# Patient Record
Sex: MALE | Race: WHITE | NOT HISPANIC OR LATINO | Employment: FULL TIME | URBAN - METROPOLITAN AREA
[De-identification: names, ages, dates, MRNs, and addresses within clinical notes are randomized per-mention and may not be internally consistent; named-entity substitution may affect disease eponyms.]

---

## 2023-05-19 ENCOUNTER — HOSPITAL ENCOUNTER (EMERGENCY)
Facility: HOSPITAL | Age: 28
Discharge: HOME | End: 2023-05-19
Attending: EMERGENCY MEDICINE

## 2023-05-19 ENCOUNTER — APPOINTMENT (EMERGENCY)
Dept: RADIOLOGY | Facility: HOSPITAL | Age: 28
End: 2023-05-19

## 2023-05-19 VITALS
BODY MASS INDEX: 25.18 KG/M2 | TEMPERATURE: 98.3 F | OXYGEN SATURATION: 97 % | DIASTOLIC BLOOD PRESSURE: 75 MMHG | RESPIRATION RATE: 18 BRPM | HEART RATE: 84 BPM | HEIGHT: 69 IN | WEIGHT: 170 LBS | SYSTOLIC BLOOD PRESSURE: 119 MMHG

## 2023-05-19 DIAGNOSIS — K59.00 CONSTIPATION: Primary | ICD-10-CM

## 2023-05-19 RX ORDER — LACTULOSE 20 G/30ML
20 SOLUTION ORAL DAILY
Qty: 210 ML | Refills: 0 | Status: SHIPPED | OUTPATIENT
Start: 2023-05-19 | End: 2023-05-26

## 2023-05-19 NOTE — ED PROVIDER NOTES
History  Chief Complaint   Patient presents with   • Constipation     Pt presents today for constipation  Last BM was this morning, however he has been using milk of magnesia and enemas  States hes gone about 4-5 times over the last 2 weeks  68-year-old male patient presenting here today with a chief complaint of not moving his bowels normally  Symptoms started about 3 weeks ago  He reports that prior to that he would go every 2 to 3 days  Last bowel movement was probably 2 days ago for him  No focal abdominal tenderness on physical examination he has been using milk of magnesia over-the-counter with minimal relief          None       Past Medical History:   Diagnosis Date   • Heroin use     hasnt used in 6-7 years   • Stroke [facility])        Past Surgical History:   Procedure Laterality Date   • AORTIC VALVE REPAIR     • BRAIN SURGERY         History reviewed  No pertinent family history  I have reviewed and agree with the history as documented  E-Cigarette/Vaping     E-Cigarette/Vaping Substances   • Nicotine Yes      Social History     Tobacco Use   • Smoking status: Former     Types: Cigarettes   • Smokeless tobacco: Never   Substance Use Topics   • Drug use: Not Currently     Types: Heroin     Comment: 6-7 years sober       Review of Systems   Constitutional: Negative for chills and fever  HENT: Negative for ear pain and sore throat  Eyes: Negative for pain and visual disturbance  Respiratory: Negative for cough and shortness of breath  Cardiovascular: Negative for chest pain and palpitations  Gastrointestinal: Negative for abdominal pain and vomiting  Genitourinary: Negative for dysuria and hematuria  Musculoskeletal: Negative for arthralgias and back pain  Skin: Negative for color change and rash  Neurological: Negative for seizures and syncope  All other systems reviewed and are negative  Physical Exam  Physical Exam  Vitals and nursing note reviewed     Constitutional: General: He is not in acute distress  Appearance: He is well-developed  HENT:      Head: Normocephalic and atraumatic  Eyes:      General:         Right eye: No discharge  Left eye: No discharge  Conjunctiva/sclera: Conjunctivae normal    Cardiovascular:      Rate and Rhythm: Normal rate  Pulmonary:      Effort: Pulmonary effort is normal  No respiratory distress  Abdominal:      General: There is no distension  Tenderness: There is no guarding  Musculoskeletal:         General: No deformity  Cervical back: Normal range of motion and neck supple  Skin:     General: Skin is warm and dry  Neurological:      Mental Status: He is alert and oriented to person, place, and time  Coordination: Coordination normal          Vital Signs  ED Triage Vitals [05/19/23 1312]   Temperature Pulse Respirations Blood Pressure SpO2   98 3 °F (36 8 °C) 84 18 119/75 97 %      Temp Source Heart Rate Source Patient Position - Orthostatic VS BP Location FiO2 (%)   Tympanic Monitor Sitting Left arm --      Pain Score       --           Vitals:    05/19/23 1312   BP: 119/75   Pulse: 84   Patient Position - Orthostatic VS: Sitting         Visual Acuity      ED Medications  Medications - No data to display    Diagnostic Studies  Results Reviewed     None                 XR abdomen obstruction series    (Results Pending)              Procedures  Procedures         ED Course                                             Medical Decision Making  Some stool noted in the colon on obstruction series  Nonobstructive bowel gas pattern  Patient instructed to perform an enema at home    Constipation: acute illness or injury  Amount and/or Complexity of Data Reviewed  Radiology: ordered  Risk  Prescription drug management            Disposition  Final diagnoses:   Constipation     Time reflects when diagnosis was documented in both MDM as applicable and the Disposition within this note     Time User Action Codes Description Comment    5/19/2023  2:55 PM Sapna Ortiz Add [K59 00] Constipation       ED Disposition     ED Disposition   Discharge    Condition   Stable    Date/Time   Fri May 19, 2023  2:55 PM    Comment   Nimisha Duval discharge to home/self care  Follow-up Information     Follow up With Specialties Details Why Contact Info Additional Dmitriy [facility] Schedule an appointment as soon as possible for a visit  For Continued Evaluation [address] [address]  Joyce Andrews [address] [facility], [address] [place] [address], [facility] [place], [place], [address] [facility]           Discharge Medication List as of 5/19/2023  2:56 PM      START taking these medications    Details   lactulose 20 g/30 mL Take 30 mL (20 g total) by mouth daily for 7 days, Starting Fri 5/19/2023, Until Fri 5/26/2023, Normal             No discharge procedures on file      PDMP Review     None          ED Provider  Electronically Signed by           NATIVIDAD Delgado  05/19/23 3828

## 2023-10-24 ENCOUNTER — OFFICE VISIT (OUTPATIENT)
Age: 28
End: 2023-10-24
Payer: COMMERCIAL

## 2023-10-24 VITALS
SYSTOLIC BLOOD PRESSURE: 119 MMHG | BODY MASS INDEX: 25.37 KG/M2 | OXYGEN SATURATION: 98 % | HEART RATE: 76 BPM | TEMPERATURE: 98.5 F | WEIGHT: 171.8 LBS | DIASTOLIC BLOOD PRESSURE: 58 MMHG

## 2023-10-24 DIAGNOSIS — H00.011 HORDEOLUM OF RIGHT UPPER EYELID, UNSPECIFIED HORDEOLUM TYPE: Primary | ICD-10-CM

## 2023-10-24 DIAGNOSIS — H10.9 CONJUNCTIVITIS OF RIGHT EYE, UNSPECIFIED CONJUNCTIVITIS TYPE: ICD-10-CM

## 2023-10-24 PROCEDURE — 99213 OFFICE O/P EST LOW 20 MIN: CPT | Performed by: EMERGENCY MEDICINE

## 2023-10-24 RX ORDER — OFLOXACIN 3 MG/ML
1 SOLUTION/ DROPS OPHTHALMIC 4 TIMES DAILY
Qty: 5 ML | Refills: 0 | Status: SHIPPED | OUTPATIENT
Start: 2023-10-24

## 2023-10-24 NOTE — LETTER
October 24, 2023     Patient: Pamela Millard   YOB: 1995   Date of Visit: 10/24/2023       To Whom It May Concern: It is my medical opinion that Pamela Millard may return to work on 10/26/2023 . If you have any questions or concerns, please don't hesitate to call.          Sincerely,        Bulmaro Santos DO    CC: No Recipients

## 2023-10-24 NOTE — PATIENT INSTRUCTIONS
Meds as instructed  Use your own hand towel  Wipe discharge with a warm washcloth from the inside out  F/u with PCP in 2-3 days

## 2023-10-24 NOTE — PROGRESS NOTES
[facility]        NAME: Lizabeth Lawton is a 32 y.o. male  : 1995    MRN: [id number]  DATE: 2023  TIME: 11:23 AM    Assessment and Plan   Hordeolum of right upper eyelid, unspecified hordeolum type [H00.011]  1. Hordeolum of right upper eyelid, unspecified hordeolum type        2. Conjunctivitis of right eye, unspecified conjunctivitis type  ofloxacin (OCUFLOX) 0.3 % ophthalmic solution            Patient Instructions     Patient Instructions    Meds as instructed  Use your own hand towel  Wipe discharge with a warm washcloth from the inside out  F/u with PCP in 2-3 days      Follow up with PCP in 3-5 days. Proceed to  ER if symptoms worsen. Chief Complaint     Chief Complaint   Patient presents with    Swollen Eyelid     Right eyelid swollen, some discharge came out of eye this morning. History of Present Illness       33 yo w male with cc of swelling and discharge from R eye this am.  No fever/chills. Review of Systems   Review of Systems   Constitutional:  Negative for chills and fever. HENT:  Negative for congestion and rhinorrhea. Eyes:  Positive for pain, discharge, redness and itching. Negative for visual disturbance. Respiratory:  Negative for shortness of breath and wheezing. Cardiovascular:  Negative for chest pain and palpitations. Gastrointestinal:  Negative for abdominal pain and vomiting. Endocrine: Negative for polydipsia and polyuria. Genitourinary:  Negative for dysuria and hematuria. Musculoskeletal:  Negative for arthralgias, gait problem and neck stiffness. Skin:  Negative for rash and wound. Neurological:  Negative for dizziness and headaches. Psychiatric/Behavioral:  Negative for confusion and suicidal ideas.           Current Medications       Current Outpatient Medications:     ofloxacin (OCUFLOX) 0.3 % ophthalmic solution, Administer 1 drop to the right eye 4 (four) times a day, Disp: 5 mL, Rfl: 0    lactulose 20 g/30 mL, Take 30 mL (20 g total) by mouth daily for 7 days, Disp: 210 mL, Rfl: 0    Current Allergies     Allergies as of 10/24/2023    (No Known Allergies)            The following portions of the patient's history were reviewed and updated as appropriate: allergies, current medications, past family history, past medical history, past social history, past surgical history and problem list.     Past Medical History:   Diagnosis Date    Heroin use     hasnt used in 6-7 years    Stroke [facility])        Past Surgical History:   Procedure Laterality Date    AORTIC VALVE REPAIR      BRAIN SURGERY      IR STROKE ALERT  3/22/2016       No family history on file. Medications have been verified. Objective   /58   Pulse 76   Temp 98.5 °F (36.9 °C)   Wt 77.9 kg (171 lb 12.8 oz)   SpO2 98%   BMI 25.37 kg/m²        Physical Exam     Physical Exam  Vitals and nursing note reviewed. Constitutional:       Appearance: Normal appearance. Comments: 31 yo male with swollen right upper eyelid sitting on the exam table in NAD   HENT:      Head: Normocephalic and atraumatic. Eyes:      General:         Right eye: Discharge present. Extraocular Movements: Extraocular movements intact. Pupils: Pupils are equal, round, and reactive to light. Comments: R upper eyelid:  + swelling consistent with a Hordeolum and tenderness to mid upper eyelid. R eye:  + erythema of the conjunctiva consistent with conjunctivitis; slight discharge/crusting noted. Cardiovascular:      Rate and Rhythm: Normal rate. Pulmonary:      Effort: Pulmonary effort is normal.   Musculoskeletal:      Cervical back: Normal range of motion. Skin:     General: Skin is warm and dry. Neurological:      Mental Status: He is alert.    Psychiatric:         Mood and Affect: Mood normal.

## 2023-11-26 ENCOUNTER — HOSPITAL ENCOUNTER (EMERGENCY)
Facility: HOSPITAL | Age: 28
Discharge: HOME | End: 2023-11-27
Attending: STUDENT IN AN ORGANIZED HEALTH CARE EDUCATION/TRAINING PROGRAM
Payer: COMMERCIAL

## 2023-11-26 ENCOUNTER — OFFICE VISIT (OUTPATIENT)
Age: 28
End: 2023-11-26
Payer: COMMERCIAL

## 2023-11-26 VITALS
TEMPERATURE: 96.9 F | DIASTOLIC BLOOD PRESSURE: 64 MMHG | OXYGEN SATURATION: 100 % | WEIGHT: 174.6 LBS | RESPIRATION RATE: 18 BRPM | SYSTOLIC BLOOD PRESSURE: 110 MMHG | HEART RATE: 58 BPM | BODY MASS INDEX: 25.78 KG/M2

## 2023-11-26 DIAGNOSIS — K04.7 DENTAL INFECTION: Primary | ICD-10-CM

## 2023-11-26 DIAGNOSIS — K04.7 DENTAL ABSCESS: Primary | ICD-10-CM

## 2023-11-26 DIAGNOSIS — K08.89 TOOTH PAIN: ICD-10-CM

## 2023-11-26 PROBLEM — R56.9 SEIZURE (HCC): Status: ACTIVE | Noted: 2023-05-25

## 2023-11-26 PROCEDURE — 99285 EMERGENCY DEPT VISIT HI MDM: CPT

## 2023-11-26 PROCEDURE — 85025 COMPLETE CBC W/AUTO DIFF WBC: CPT

## 2023-11-26 PROCEDURE — 80053 COMPREHEN METABOLIC PANEL: CPT

## 2023-11-26 PROCEDURE — 99213 OFFICE O/P EST LOW 20 MIN: CPT

## 2023-11-26 PROCEDURE — 99284 EMERGENCY DEPT VISIT MOD MDM: CPT

## 2023-11-26 PROCEDURE — 36415 COLL VENOUS BLD VENIPUNCTURE: CPT

## 2023-11-26 RX ORDER — CLINDAMYCIN HYDROCHLORIDE 300 MG/1
300 CAPSULE ORAL 4 TIMES DAILY
Qty: 28 CAPSULE | Refills: 0 | Status: SHIPPED | OUTPATIENT
Start: 2023-11-26 | End: 2023-12-03

## 2023-11-26 RX ORDER — CARVEDILOL 25 MG/1
25 TABLET ORAL DAILY
COMMUNITY
Start: 2023-09-05

## 2023-11-26 RX ORDER — WARFARIN SODIUM 4 MG/1
TABLET ORAL
COMMUNITY
Start: 2023-11-17

## 2023-11-26 RX ORDER — ASPIRIN 81 MG/1
81 TABLET ORAL
COMMUNITY

## 2023-11-26 RX ORDER — LEVETIRACETAM 500 MG/1
TABLET, FILM COATED, EXTENDED RELEASE ORAL
COMMUNITY
Start: 2023-10-26

## 2023-11-26 NOTE — PROGRESS NOTES
[facility]        NAME: Allen Cage is a 32 y.o. male  : 1995    MRN: [id number]  DATE: 2023  TIME: 2:16 PM    Assessment and Plan   Dental infection [K04.7]  1. 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 States he does not remember taking other mycin medications in the past. Recently finished course of amoxicillin from dentist but infection not improved. Concerned due to having mechanical heart valve. He is willing to try Clindamycin as the amoxicillin he has been taking has not been effective. Patient Instructions   Start and complete course of antibiotcs. If you develop allergic reactions such as hives or a rash, stop taking the medication and proceed to the Emergency Department for re-evaluation. Follow up with your dentist.    Chief Complaint     Chief Complaint   Patient presents with   • Dental Pain     C/o right upper tooth pain. Patient stated that he woke up with right side of face swollen. Otc taken. History of Present Illness       Right upper tooth pain getting worse starting yesterday, noticed right side of face is swollen this morning. States he has been to the dentist for the same tooth and is in the process of having procedures scheduled to take care of it. States he has been on a 7 day course of amoxicillin and notices that today his face is more swollen and it feels like the swelling has increased. Review of Systems   Review of Systems   Constitutional:  Negative for chills and fever. HENT:  Positive for dental problem and facial swelling. Negative for ear pain, sore throat and trouble swallowing. Eyes:  Negative for pain and visual disturbance. Respiratory:  Negative for cough and shortness of breath. Cardiovascular:  Negative for chest pain and palpitations. Gastrointestinal:  Negative for abdominal pain and vomiting.    Genitourinary:  Negative for dysuria and hematuria. Musculoskeletal:  Negative for arthralgias and back pain. Skin:  Negative for color change and rash. Neurological:  Negative for dizziness, seizures, syncope, weakness and light-headedness. All other systems reviewed and are negative. Current Medications       Current Outpatient Medications:   •  carvedilol (COREG) 25 mg tablet, Take 25 mg by mouth daily, Disp: , Rfl:   •  clindamycin (CLEOCIN) 300 MG capsule, Take 1 capsule (300 mg total) by mouth 4 (four) times a day for 7 days, Disp: 28 capsule, Rfl: 0  •  levETIRAcetam (KEPPRA XR) 500 MG extended-release tablet, TAKE 4 TABLETS BY MOUTH ONCE DAILY DO NOT CRUSH, CHEW, OR SPLIT, Disp: , Rfl:   •  warfarin (COUMADIN) 4 mg tablet, TWO 4MG TABLETS DAILY, Disp: , Rfl:   •  aspirin (ECOTRIN LOW STRENGTH) 81 mg EC tablet, Take 81 mg by mouth, Disp: , Rfl:   •  lactulose 20 g/30 mL, Take 30 mL (20 g total) by mouth daily for 7 days, Disp: 210 mL, Rfl: 0  •  ofloxacin (OCUFLOX) 0.3 % ophthalmic solution, Administer 1 drop to the right eye 4 (four) times a day, Disp: 5 mL, Rfl: 0    Current Allergies     Allergies as of 11/26/2023 - Reviewed 11/26/2023   Allergen Reaction Noted   • Vancomycin Other (See Comments) 07/24/2008   • Cefazolin Other (See Comments) 07/24/2008            The following portions of the patient's history were reviewed and updated as appropriate: allergies, current medications, past family history, past medical history, past social history, past surgical history and problem list.     Past Medical History:   Diagnosis Date   • Heroin use     hasnt used in 6-7 years   • Stroke [facility])        Past Surgical History:   Procedure Laterality Date   • AORTIC VALVE REPAIR     • BRAIN SURGERY     • IR STROKE ALERT  3/22/2016       History reviewed. No pertinent family history. Medications have been verified.         Objective   /64   Pulse 58   Temp (!) 96.9 °F (36.1 °C)   Resp 18   Wt 79.2 kg (174 lb 9.6 oz) SpO2 100%   BMI 25.78 kg/m²   No LMP for male patient. Physical Exam     Physical Exam  Vitals and nursing note reviewed. Constitutional:       Appearance: Normal appearance. HENT:      Head: Normocephalic and atraumatic. Mouth/Throat:     Pulmonary:      Effort: Pulmonary effort is normal.   Skin:     General: Skin is warm and dry. Capillary Refill: Capillary refill takes less than 2 seconds. Neurological:      General: No focal deficit present. Mental Status: He is alert and oriented to person, place, and time. Mental status is at baseline. Sensory: No sensory deficit. Motor: No weakness. Psychiatric:         Mood and Affect: Mood normal.         Behavior: Behavior normal.         Thought Content:  Thought content normal.

## 2023-11-26 NOTE — PATIENT INSTRUCTIONS
Start and complete course of antibiotcs. If you develop allergic reactions such as hives or a rash, stop taking the medication and proceed to the Emergency Department for re-evaluation.     Follow up with your dentist.

## 2023-11-27 ENCOUNTER — APPOINTMENT (EMERGENCY)
Dept: CT IMAGING | Facility: HOSPITAL | Age: 28
End: 2023-11-27
Payer: COMMERCIAL

## 2023-11-27 VITALS
HEART RATE: 55 BPM | SYSTOLIC BLOOD PRESSURE: 108 MMHG | OXYGEN SATURATION: 97 % | DIASTOLIC BLOOD PRESSURE: 56 MMHG | TEMPERATURE: 98.2 F | RESPIRATION RATE: 18 BRPM

## 2023-11-27 LAB
ALBUMIN SERPL BCP-MCNC: 4.2 G/DL (ref 3.5–5)
ALP SERPL-CCNC: 27 U/L (ref 34–104)
ALT SERPL W P-5'-P-CCNC: 15 U/L (ref 7–52)
ANION GAP SERPL CALCULATED.3IONS-SCNC: 7 MMOL/L
AST SERPL W P-5'-P-CCNC: 20 U/L (ref 13–39)
BASOPHILS # BLD AUTO: 0.05 THOUSANDS/ÂΜL (ref 0–0.1)
BASOPHILS NFR BLD AUTO: 1 % (ref 0–1)
BILIRUB SERPL-MCNC: 1.26 MG/DL (ref 0.2–1)
BUN SERPL-MCNC: 12 MG/DL (ref 5–25)
CALCIUM SERPL-MCNC: 9 MG/DL (ref 8.4–10.2)
CHLORIDE SERPL-SCNC: 104 MMOL/L (ref 96–108)
CO2 SERPL-SCNC: 26 MMOL/L (ref 21–32)
CREAT SERPL-MCNC: 0.89 MG/DL (ref 0.6–1.3)
EOSINOPHIL # BLD AUTO: 0.3 THOUSAND/ÂΜL (ref 0–0.61)
EOSINOPHIL NFR BLD AUTO: 6 % (ref 0–6)
ERYTHROCYTE [DISTWIDTH] IN BLOOD BY AUTOMATED COUNT: 12.8 % (ref 11.6–15.1)
GFR SERPL CREATININE-BSD FRML MDRD: 115 ML/MIN/1.73SQ M
GLUCOSE SERPL-MCNC: 105 MG/DL (ref 65–140)
HCT VFR BLD AUTO: 38.8 % (ref 36.5–49.3)
HGB BLD-MCNC: 13.3 G/DL (ref 12–17)
IMM GRANULOCYTES # BLD AUTO: 0.01 THOUSAND/UL (ref 0–0.2)
IMM GRANULOCYTES NFR BLD AUTO: 0 % (ref 0–2)
LYMPHOCYTES # BLD AUTO: 0.86 THOUSANDS/ÂΜL (ref 0.6–4.47)
LYMPHOCYTES NFR BLD AUTO: 16 % (ref 14–44)
MCH RBC QN AUTO: 31.3 PG (ref 26.8–34.3)
MCHC RBC AUTO-ENTMCNC: 34.3 G/DL (ref 31.4–37.4)
MCV RBC AUTO: 91 FL (ref 82–98)
MONOCYTES # BLD AUTO: 0.59 THOUSAND/ÂΜL (ref 0.17–1.22)
MONOCYTES NFR BLD AUTO: 11 % (ref 4–12)
NEUTROPHILS # BLD AUTO: 3.5 THOUSANDS/ÂΜL (ref 1.85–7.62)
NEUTS SEG NFR BLD AUTO: 66 % (ref 43–75)
NRBC BLD AUTO-RTO: 0 /100 WBCS
PLATELET # BLD AUTO: 195 THOUSANDS/UL (ref 149–390)
PMV BLD AUTO: 9.4 FL (ref 8.9–12.7)
POTASSIUM SERPL-SCNC: 3.9 MMOL/L (ref 3.5–5.3)
PROT SERPL-MCNC: 7.2 G/DL (ref 6.4–8.4)
RBC # BLD AUTO: 4.25 MILLION/UL (ref 3.88–5.62)
SODIUM SERPL-SCNC: 137 MMOL/L (ref 135–147)
WBC # BLD AUTO: 5.31 THOUSAND/UL (ref 4.31–10.16)

## 2023-11-27 PROCEDURE — 70491 CT SOFT TISSUE NECK W/DYE: CPT

## 2023-11-27 PROCEDURE — G1004 CDSM NDSC: HCPCS

## 2023-11-27 RX ADMIN — IOHEXOL 85 ML: 350 INJECTION, SOLUTION INTRAVENOUS at 00:45

## 2023-11-27 NOTE — DISCHARGE INSTRUCTIONS
Please continue taking clindamycin as directed. I provided you with the contact information for oral maxillary surgery office. Please call their office in the morning to set up an appointment. Monitor symptoms for the next 48 hours. If you notice no improvement in symptoms, return to ED for further evaluation.

## 2023-11-27 NOTE — ED PROVIDER NOTES
History  Chief Complaint   Patient presents with    Dental Pain     Pt presents ambulatory c/o "right upper tooth pain that started hurting bad last night and woke up with swelling. Pt placed on abx this morning. But still reports pain. PT concerned d/t having an artifical heart valve."     64-year-old male with history of aortic valve replacement presents to ED for evaluation of dental pain. Patient states that for over a week he has been dealing with right upper dental pain. He has seen his dentist in regards to the dental pain. He was given a weeks worth of amoxicillin for suspected dental infection. He was seen at urgent care today due to waking up and noticing the right side of his face is more swollen despite use of amoxicillin. He was given a prescription for clindamycin at urgent care. Patient took the clindamycin as directed. Despite taking the clindamycin today he is noticing the swelling is increased in size. Patient is extra concerned about the infection due to having a aortic valve replacement performed previously. Patient denies fever, chills, nausea, vomiting, shortness of breath, increased urinary frequency, pain with urination, seizure, syncope, chest pain. Patient does have plan with his dentist to have tooth removed in the future however currently he is very concerned about possible spread of infection to his artificial aortic valve. Prior to Admission Medications   Prescriptions Last Dose Informant Patient Reported?  Taking?   aspirin (ECOTRIN LOW STRENGTH) 81 mg EC tablet   Yes No   Sig: Take 81 mg by mouth   carvedilol (COREG) 25 mg tablet   Yes No   Sig: Take 25 mg by mouth daily   clindamycin (CLEOCIN) 300 MG capsule   No No   Sig: Take 1 capsule (300 mg total) by mouth 4 (four) times a day for 7 days   lactulose 20 g/30 mL   No No   Sig: Take 30 mL (20 g total) by mouth daily for 7 days   levETIRAcetam (KEPPRA XR) 500 MG extended-release tablet   Yes No   Sig: TAKE 4 TABLETS BY MOUTH ONCE DAILY DO NOT CRUSH, CHEW, OR SPLIT   ofloxacin (OCUFLOX) 0.3 % ophthalmic solution   No No   Sig: Administer 1 drop to the right eye 4 (four) times a day   warfarin (COUMADIN) 4 mg tablet   Yes No   Sig: TWO 4MG TABLETS DAILY      Facility-Administered Medications: None       Past Medical History:   Diagnosis Date    Heroin use     hasnt used in 6-7 years    Stroke [facility])        Past Surgical History:   Procedure Laterality Date    AORTIC VALVE REPAIR      BRAIN SURGERY      IR STROKE ALERT  3/22/2016       History reviewed. No pertinent family history. I have reviewed and agree with the history as documented. E-Cigarette/Vaping     E-Cigarette/Vaping Substances    Nicotine Yes      Social History     Tobacco Use    Smoking status: Former     Types: Cigarettes    Smokeless tobacco: Never   Substance Use Topics    Drug use: Not Currently     Types: Heroin     Comment: 6-7 years sober       Review of Systems   Constitutional:  Negative for chills, diaphoresis, fatigue and fever. HENT:  Positive for dental problem and facial swelling. Negative for drooling, ear pain, sore throat and trouble swallowing. Eyes:  Negative for pain and visual disturbance. Respiratory:  Negative for cough, chest tightness, shortness of breath, wheezing and stridor. Cardiovascular:  Negative for chest pain and palpitations. Gastrointestinal:  Negative for abdominal pain and vomiting. Genitourinary:  Negative for dysuria and hematuria. Musculoskeletal:  Negative for arthralgias and back pain. Skin:  Negative for color change and rash. Neurological:  Negative for seizures and syncope. All other systems reviewed and are negative. Physical Exam  Physical Exam  Vitals and nursing note reviewed. Constitutional:       General: He is not in acute distress. Appearance: He is well-developed. He is not toxic-appearing or diaphoretic. HENT:      Head: Normocephalic and atraumatic. Mouth/Throat:      Mouth: No angioedema. Dentition: Abnormal dentition. Dental tenderness, gingival swelling and dental caries present. No dental abscesses or gum lesions. Tonsils: No tonsillar exudate or tonsillar abscesses. Eyes:      Conjunctiva/sclera: Conjunctivae normal.   Cardiovascular:      Rate and Rhythm: Normal rate and regular rhythm. Pulses: Normal pulses. Heart sounds: Normal heart sounds. No murmur heard. No friction rub. No gallop. Pulmonary:      Effort: Pulmonary effort is normal. No respiratory distress. Breath sounds: Normal breath sounds. No stridor. No wheezing, rhonchi or rales. Abdominal:      Palpations: Abdomen is soft. Tenderness: There is no abdominal tenderness. Musculoskeletal:         General: No swelling. Cervical back: Neck supple. Skin:     General: Skin is warm and dry. Capillary Refill: Capillary refill takes less than 2 seconds. Coloration: Skin is not jaundiced or pale. Findings: No rash. Neurological:      Mental Status: He is alert.    Psychiatric:         Mood and Affect: Mood normal.         Vital Signs  ED Triage Vitals [11/26/23 2225]   Temperature Pulse Respirations Blood Pressure SpO2   98.2 °F (36.8 °C) 69 18 135/64 99 %      Temp Source Heart Rate Source Patient Position - Orthostatic VS BP Location FiO2 (%)   Temporal Monitor Sitting Left arm --      Pain Score       --           Vitals:    11/26/23 2225 11/27/23 0130 11/27/23 0200   BP: 135/64 117/64 108/56   Pulse: 69 57 55   Patient Position - Orthostatic VS: Sitting  Sitting         Visual Acuity  Visual Acuity      Flowsheet Row Most Recent Value   L Pupil Size (mm) 3   R Pupil Size (mm) 3            ED Medications  Medications   iohexol (OMNIPAQUE) 350 MG/ML injection (MULTI-DOSE) 85 mL (85 mL Intravenous Given 11/27/23 0045)       Diagnostic Studies  Results Reviewed       Procedure Component Value Units Date/Time    Comprehensive metabolic panel [[id number]]  (Abnormal) Collected: 11/26/23 2359    Lab Status: Final result Specimen: Blood from Arm, Right Updated: 11/27/23 0022     Sodium 137 mmol/L      Potassium 3.9 mmol/L      Chloride 104 mmol/L      CO2 26 mmol/L      ANION GAP 7 mmol/L      BUN 12 mg/dL      Creatinine 0.89 mg/dL      Glucose 105 mg/dL      Calcium 9.0 mg/dL      AST 20 U/L      ALT 15 U/L      Alkaline Phosphatase 27 U/L      Total Protein 7.2 g/dL      Albumin 4.2 g/dL      Total Bilirubin 1.26 mg/dL      eGFR 115 ml/min/1.73sq m     Narrative:      [facility] (CKD):     Stage 1 with normal or high GFR (GFR > 90 mL/min/1.73 square meters)    Stage 2 Mild CKD (GFR = 60-89 mL/min/1.73 square meters)    Stage 3A Moderate CKD (GFR = 45-59 mL/min/1.73 square meters)    Stage 3B Moderate CKD (GFR = 30-44 mL/min/1.73 square meters)    Stage 4 Severe CKD (GFR = 15-29 mL/min/1.73 square meters)    Stage 5 End Stage CKD (GFR <15 mL/min/1.73 square meters)  Note: GFR calculation is accurate only with a steady state creatinine    CBC and differential [[id number]] Collected: 11/26/23 2359    Lab Status: Final result Specimen: Blood from Arm, Right Updated: 11/27/23 0006     WBC 5.31 Thousand/uL      RBC 4.25 Million/uL      Hemoglobin 13.3 g/dL      Hematocrit 38.8 %      MCV 91 fL      MCH 31.3 pg      MCHC 34.3 g/dL      RDW 12.8 %      MPV 9.4 fL      Platelets 646 Thousands/uL      nRBC 0 /100 WBCs      Neutrophils Relative 66 %      Immat GRANS % 0 %      Lymphocytes Relative 16 %      Monocytes Relative 11 %      Eosinophils Relative 6 %      Basophils Relative 1 %      Neutrophils Absolute 3.50 Thousands/µL      Immature Grans Absolute 0.01 Thousand/uL      Lymphocytes Absolute 0.86 Thousands/µL      Monocytes Absolute 0.59 Thousand/µL      Eosinophils Absolute 0.30 Thousand/µL      Basophils Absolute 0.05 Thousands/µL                    CT soft tissue neck with contrast   ED Interpretation by Divina Mcrae PA-C (11/27 0458)   "1. Small dental periapical abscesses involving two separate right maxillary teeth. 2. Right malar cellulitis. 3. Mildly enlarged lymph nodes, a nonspecific finding."  Flavio Flores MD from [facility]      Final Result by Julio C Charles MD (11/27 0970)      Right facial cellulitis and phlegmonous changes adjacent to the carious right upper first molar tooth. Focal periapical abscess of this tooth results in erosion of the maxillary cortex and resultant facial soft tissue infection. No soft tissue abscess. Workstation performed: [id number]                    Procedures  Procedures         ED Course                               SBIRT 20yo+      Flowsheet Row Most Recent Value   Initial Alcohol Screen: US AUDIT-C     1. How often do you have a drink containing alcohol? 0 Filed at: 11/26/2023 2228   2. How many drinks containing alcohol do you have on a typical day you are drinking? 0 Filed at: 11/26/2023 2228   3a. Male UNDER 65: How often do you have five or more drinks on one occasion? 0 Filed at: 11/26/2023 2228   Audit-C Score 0 Filed at: 11/26/2023 2228   LUCIANA: How many times in the past year have you. .. Used an illegal drug or used a prescription medication for non-medical reasons? Never Filed at: 11/26/2023 2228                      Medical Decision Making  58-year-old male presents ED for evaluation of dental pain. For the past week he has been experiencing dental pain. He was given amoxicillin by his dentist which he completed the week long course of. Despite taking amoxicillin he has noticed the swelling of the right side of his face has continued to persist.  Today he went to urgent care and obtained a prescription of clindamycin. Patient took the clindamycin doses as directed today. He is concerned that the swelling has not reduced after a week of amoxicillin, 2 doses of clindamycin.   He has history of aortic valve replacement which does increase risk of developing infection such as endocarditis. On physical examination he is normotensive, afebrile, nontachycardic, without respiratory distress. No murmur. Normal breath sounds. Right-side of face does appear to be significantly swollen compared to left side of face. No dental abscesses seen in patient's mouth. Tooth of interest appears to have well progressed dental carry. Given patient's history of aortic valve replacement, persistence of dental infection despite course of oral antibiotic:  will obtain CT soft tissue neck with IV contrast to better characterize extent of infection. Also obtaining CBC, CMP. Differential diagnosis includes but not limited to dental infection, cellulitis of face    CBC returned WNL. CMP returned without concerning values. CT soft tissue neck with contrast returned demonstrating the following:  "Right facial cellulitis and phlegmonous changes adjacent to the carious right upper first molar tooth. Focal periapical abscess of this tooth results in erosion of the maxillary cortex and resultant facial soft tissue infection. No soft tissue abscess."    Attempted to reach out to [facility] on-call attending to discuss patient. While waiting to hear back from [facility], patient is requesting to leave as he needs to put his kids to sleep. Given that patient appears stable, afebrile, with no elevation of WBC: Patient can be discharged with strict instructions to call [facility] in the morning to arrange for a ASAP appointment for further evaluation and treatment of patient's dental infection. Strongly stressed the importance of attending follow-up with [facility] to have infected tooth addressed. Patient expresses understanding of instructions. Did notice that from the time of initial examination to the time of discharge, patient's face does appear to have reduced in swelling severity. Patient discharged.   He was given the contact information and a referral to [address] [place] [address] for further care. Prior to discharge, discharge instructions were discussed with patient at bedside. Patient was provided both verbal and written instructions. Patient is understanding of the discharge instructions and is agreeable to plan of care. Return precautions were discussed with patient bedside, patient verbalized understanding of signs and symptoms that would necessitate return to the ED. All questions were answered. Patient was comfortable with the plan of care and discharged to home. Amount and/or Complexity of Data Reviewed  Labs: ordered. Radiology: ordered and independent interpretation performed. Risk  Prescription drug management. Disposition  Final diagnoses:   Dental abscess   Tooth pain     Time reflects when diagnosis was documented in both MDM as applicable and the Disposition within this note       Time User Action Codes Description Comment    11/27/2023  3:25 AM Genie Coe [K04.7] Dental abscess     11/27/2023  3:25 AM Genie Coe [K08.89] Tooth pain           ED Disposition       ED Disposition   Discharge    Condition   Stable    Date/Time   Mon Nov 27, 2023 [address] discharge to home/self care.                    Follow-up Information       Follow up With Specialties Details Why [facility] [address]            Discharge Medication List as of 11/27/2023  3:28 AM        CONTINUE these medications which have NOT CHANGED    Details   aspirin (ECOTRIN LOW STRENGTH) 81 mg EC tablet Take 81 mg by mouth, Historical Med      carvedilol (COREG) 25 mg tablet Take 25 mg by mouth daily, Starting Tue 9/5/2023, Historical Med      clindamycin (CLEOCIN) 300 MG capsule Take 1 capsule (300 mg total) by mouth 4 (four) times a day for 7 days, Starting Sun 11/26/2023, Until Sun 12/3/2023, Normal      lactulose 20 g/30 mL Take 30 mL (20 g total) by mouth daily for 7 days, Starting Fri 5/19/2023, Until Fri 5/26/2023, Normal      levETIRAcetam (KEPPRA XR) 500 MG extended-release tablet TAKE 4 TABLETS BY MOUTH ONCE DAILY DO NOT CRUSH, CHEW, OR SPLIT, Historical Med      ofloxacin (OCUFLOX) 0.3 % ophthalmic solution Administer 1 drop to the right eye 4 (four) times a day, Starting Tue 10/24/2023, Normal      warfarin (COUMADIN) 4 mg tablet TWO 4MG TABLETS DAILY, Historical Med                 PDMP Review         Value Time User    PDMP Reviewed  Yes 11/26/2023 11:28 PM Elvis Hylton PA-C            ED Provider  Electronically Signed by             Elvis Hylton PA-C  11/28/23 0525

## 2023-12-14 ENCOUNTER — OFFICE VISIT (OUTPATIENT)
Age: 28
End: 2023-12-14
Payer: COMMERCIAL

## 2023-12-14 VITALS
WEIGHT: 168 LBS | RESPIRATION RATE: 18 BRPM | TEMPERATURE: 97.5 F | SYSTOLIC BLOOD PRESSURE: 142 MMHG | OXYGEN SATURATION: 98 % | HEART RATE: 78 BPM | BODY MASS INDEX: 24.81 KG/M2 | DIASTOLIC BLOOD PRESSURE: 73 MMHG

## 2023-12-14 DIAGNOSIS — J20.9 ACUTE BRONCHITIS, UNSPECIFIED ORGANISM: Primary | ICD-10-CM

## 2023-12-14 PROCEDURE — 99213 OFFICE O/P EST LOW 20 MIN: CPT | Performed by: PHYSICIAN ASSISTANT

## 2023-12-14 RX ORDER — AZITHROMYCIN 250 MG/1
TABLET, FILM COATED ORAL
Qty: 6 TABLET | Refills: 0 | Status: SHIPPED | OUTPATIENT
Start: 2023-12-14 | End: 2023-12-14

## 2023-12-14 RX ORDER — ALBUTEROL SULFATE 90 UG/1
2 AEROSOL, METERED RESPIRATORY (INHALATION) 3 TIMES DAILY PRN
Qty: 8 G | Refills: 0 | Status: SHIPPED | OUTPATIENT
Start: 2023-12-14 | End: 2023-12-14

## 2023-12-14 RX ORDER — AMOXICILLIN 500 MG/1
500 CAPSULE ORAL EVERY 12 HOURS SCHEDULED
Qty: 14 CAPSULE | Refills: 0 | Status: SHIPPED | OUTPATIENT
Start: 2023-12-14 | End: 2023-12-21

## 2023-12-14 NOTE — PROGRESS NOTES
[facility]        NAME: Clau Lieberman is a 32 y.o. male  : 1995    MRN: [id number]  DATE: 2023  TIME: 12:40 PM    Assessment and Plan   Acute bronchitis, unspecified organism [J20.9]  1. Acute bronchitis, unspecified organism  amoxicillin (AMOXIL) 500 mg capsule    DISCONTINUED: azithromycin (ZITHROMAX) 250 mg tablet    DISCONTINUED: albuterol (Proventil HFA) 90 mcg/act inhaler            Patient Instructions     Both albuterol and azithromycin advised not to use to its effects on warfarin and also cardiac conduction. PHARMACY WAS CONTACTED AND INFORMED NOT TO FILL. This was explained to the patient and we decided to move with amoxicillin which the patient has had previously with no side effects or allergic reactions, as per patient. Patient was also instructed to contact his cardiologist and informed that he is on an antibiotic which may affect INR results. Patient expressed verbal understanding to these instructions. Follow up with PCP in 3-5 days. Proceed to  ER if symptoms worsen. Chief Complaint     Chief Complaint   Patient presents with    Cough     Loose cough, chest congestion X 2 - 3 weeks         History of Present Illness       Home covid test was negative, as per patient. Cough  This is a new problem. The current episode started 1 to 4 weeks ago. The problem has been gradually worsening. The problem occurs constantly. Associated symptoms include nasal congestion, postnasal drip and rhinorrhea. Pertinent negatives include no chest pain, chills, fever, headaches, hemoptysis, myalgias, rash, sore throat, shortness of breath, sweats or wheezing. The symptoms are aggravated by lying down and cold air. He has tried OTC cough suppressant for the symptoms. His past medical history is significant for bronchitis. There is no history of asthma or pneumonia. Review of Systems   Review of Systems   Constitutional:  Positive for fatigue.  Negative for activity change, appetite change, chills and fever. HENT:  Positive for congestion, postnasal drip and rhinorrhea. Negative for sore throat. Eyes:  Negative for photophobia and visual disturbance. Respiratory:  Positive for cough. Negative for hemoptysis, chest tightness, shortness of breath and wheezing. Cardiovascular:  Negative for chest pain and palpitations. Gastrointestinal:  Negative for abdominal pain, diarrhea, nausea and vomiting. Musculoskeletal:  Negative for back pain, myalgias and neck stiffness. Skin:  Negative for color change and rash. Neurological:  Negative for dizziness, weakness and headaches. Psychiatric/Behavioral: Negative.            Current Medications       Current Outpatient Medications:     amoxicillin (AMOXIL) 500 mg capsule, Take 1 capsule (500 mg total) by mouth every 12 (twelve) hours for 7 days, Disp: 14 capsule, Rfl: 0    aspirin (ECOTRIN LOW STRENGTH) 81 mg EC tablet, Take 81 mg by mouth, Disp: , Rfl:     carvedilol (COREG) 25 mg tablet, Take 25 mg by mouth daily, Disp: , Rfl:     levETIRAcetam (KEPPRA XR) 500 MG extended-release tablet, TAKE 4 TABLETS BY MOUTH ONCE DAILY DO NOT CRUSH, CHEW, OR SPLIT, Disp: , Rfl:     warfarin (COUMADIN) 4 mg tablet, TWO 4MG TABLETS DAILY, Disp: , Rfl:     lactulose 20 g/30 mL, Take 30 mL (20 g total) by mouth daily for 7 days, Disp: 210 mL, Rfl: 0    ofloxacin (OCUFLOX) 0.3 % ophthalmic solution, Administer 1 drop to the right eye 4 (four) times a day (Patient not taking: Reported on 12/14/2023), Disp: 5 mL, Rfl: 0    Current Allergies     Allergies as of 12/14/2023 - Reviewed 12/14/2023   Allergen Reaction Noted    Vancomycin Other (See Comments) 07/24/2008    Cefazolin Other (See Comments) 07/24/2008            The following portions of the patient's history were reviewed and updated as appropriate: allergies, current medications, past family history, past medical history, past social history, past surgical history and problem list. Past Medical History:   Diagnosis Date    Heroin use     hasnt used in 6-7 years    Stroke [facility])        Past Surgical History:   Procedure Laterality Date    AORTIC VALVE REPAIR      BRAIN SURGERY      IR STROKE ALERT  3/22/2016       History reviewed. No pertinent family history. Medications have been verified. Objective   /73 (BP Location: Left arm, Patient Position: Sitting, Cuff Size: Adult)   Pulse 78   Temp 97.5 °F (36.4 °C) (Tympanic)   Resp 18   Wt 76.2 kg (168 lb)   SpO2 98%   BMI 24.81 kg/m²        Physical Exam     Physical Exam  Vitals and nursing note reviewed. Constitutional:       General: He is not in acute distress. Appearance: Normal appearance. He is normal weight. He is not ill-appearing. HENT:      Head: Normocephalic and atraumatic. Right Ear: Tympanic membrane, ear canal and external ear normal.      Left Ear: Tympanic membrane, ear canal and external ear normal.      Nose: Nose normal.      Mouth/Throat:      Mouth: Mucous membranes are moist.      Pharynx: Oropharynx is clear. No oropharyngeal exudate or posterior oropharyngeal erythema. Eyes:      General: No scleral icterus. Extraocular Movements: Extraocular movements intact. Conjunctiva/sclera: Conjunctivae normal.      Pupils: Pupils are equal, round, and reactive to light. Cardiovascular:      Rate and Rhythm: Normal rate and regular rhythm. Pulses: Normal pulses. Heart sounds: Normal heart sounds. Pulmonary:      Effort: Pulmonary effort is normal. No respiratory distress. Breath sounds: Rhonchi present. No wheezing or rales. Abdominal:      Palpations: Abdomen is soft. Musculoskeletal:         General: Normal range of motion. Cervical back: Normal range of motion and neck supple. No tenderness. Lymphadenopathy:      Cervical: No cervical adenopathy. Skin:     General: Skin is warm and dry. Findings: No rash.    Neurological:      General: No focal deficit present. Mental Status: He is alert and oriented to person, place, and time. Coordination: Coordination normal.      Gait: Gait normal.   Psychiatric:         Mood and Affect: Mood normal.         Behavior: Behavior normal.         Thought Content:  Thought content normal.         Judgment: Judgment normal.

## 2023-12-14 NOTE — PATIENT INSTRUCTIONS
Acute Bronchitis   WHAT YOU NEED TO KNOW:   Acute bronchitis is swelling and irritation in your lungs. It is usually caused by a virus and most often happens in the winter. Bronchitis may also be caused by bacteria or by a chemical irritant, such as smoke. DISCHARGE INSTRUCTIONS:   Return to the emergency department if:   You cough up blood. Your lips or fingernails turn blue. You feel like you are not getting enough air when you breathe. Call your doctor if:   Your symptoms do not go away or get worse, even after treatment. Your cough does not get better within 4 weeks. You have questions or concerns about your condition or care. Medicines: You may need any of the following:  Cough suppressants  decrease your urge to cough. Decongestants  help loosen mucus in your lungs and make it easier to cough up. This can help you breathe easier. Inhalers  may be given. Your healthcare provider may give you one or more inhalers to help you breathe easier and cough less. An inhaler gives you medicine to open your airways. Ask your healthcare provider to show you how to use your inhaler correctly. Antiviral medicine  treats infections caused by a virus. Antibiotics  may be given if your bronchitis is caused by bacteria or if you have lung condition. Acetaminophen  decreases pain and fever. It is available without a doctor's order. Ask how much to take and how often to take it. Follow directions. Read the labels of all other medicines you are using to see if they also contain acetaminophen, or ask your doctor or pharmacist. Acetaminophen can cause liver damage if not taken correctly. NSAIDs  help decrease swelling and pain or fever. This medicine is available with or without a doctor's order. NSAIDs can cause stomach bleeding or kidney problems in certain people. If you take blood thinner medicine, always ask your healthcare provider if NSAIDs are safe for you.  Always read the medicine label and follow directions. Self-care:   Drink liquids as directed. You may need to drink more liquids than usual to stay hydrated. Ask how much liquid to drink each day and which liquids are best for you. Use a cool mist humidifier. This increases air moisture in your home. This may make it easier for you to breathe and help decrease your cough. Get more rest.  Rest helps your body to heal. Slowly start to do more each day. Rest when you feel it is needed. Prevent acute bronchitis:       Ask about vaccines you may need. Get a flu vaccine each year as soon as recommended, usually in September or October. Ask your healthcare provider if you should also get a pneumonia or COVID-19 vaccine. Your healthcare provider can tell you if you should also get other vaccines, and when to get them. Prevent the spread of germs. You can decrease your risk for acute bronchitis and other illnesses by doing the following:     Wash your hands often with soap and water. Carry germ-killing hand lotion or gel with you. You can use the lotion or gel to clean your hands when soap and water are not available. Do not touch your eyes, nose, or mouth unless you have washed your hands first.    Always cover your mouth when you cough to prevent the spread of germs. It is best to cough into a tissue or your shirt sleeve instead of into your hand. Ask those around you to cover their mouths when they cough. Try to avoid people who have a cold or the flu. If you are sick, stay away from others as much as possible. Avoid irritants in the air. Avoid chemicals, fumes, and dust. Wear a face mask if you must work around dust or fumes. Stay inside on days when air pollution levels are high. If you have allergies, stay inside when pollen counts are high. Do not use aerosol products, such as spray-on deodorant, bug spray, and hair spray. Do not smoke or be around others who are smoking.   Nicotine and other chemicals in cigarettes and cigars can cause lung damage. Ask your healthcare provider for information if you currently smoke and need help to quit. E-cigarettes or smokeless tobacco still contain nicotine. Talk to your healthcare provider before you use these products. Follow up with your doctor as directed:  Write down questions you have so you will remember to ask them during your follow-up visits. © Copyright [employer] 2023 Information is for End User's use only and may not be sold, redistributed or otherwise used for commercial purposes. The above information is an  only. It is not intended as medical advice for individual conditions or treatments. Talk to your doctor, nurse or pharmacist before following any medical regimen to see if it is safe and effective for you.

## 2024-10-22 ENCOUNTER — HOSPITAL ENCOUNTER (EMERGENCY)
Facility: HOSPITAL | Age: 29
Discharge: HOME | End: 2024-10-22
Attending: EMERGENCY MEDICINE
Payer: COMMERCIAL

## 2024-10-22 VITALS
TEMPERATURE: 97.8 F | DIASTOLIC BLOOD PRESSURE: 79 MMHG | OXYGEN SATURATION: 97 % | HEART RATE: 69 BPM | BODY MASS INDEX: 24.81 KG/M2 | SYSTOLIC BLOOD PRESSURE: 134 MMHG | WEIGHT: 167.99 LBS | RESPIRATION RATE: 18 BRPM

## 2024-10-22 DIAGNOSIS — R10.13 EPIGASTRIC ABDOMINAL PAIN: ICD-10-CM

## 2024-10-22 DIAGNOSIS — K29.00 ACUTE GASTRITIS WITHOUT HEMORRHAGE, UNSPECIFIED GASTRITIS TYPE: Primary | ICD-10-CM

## 2024-10-22 LAB
ALBUMIN SERPL BCG-MCNC: 4.6 G/DL (ref 3.5–5)
ALP SERPL-CCNC: 35 U/L (ref 34–104)
ALT SERPL W P-5'-P-CCNC: 29 U/L (ref 7–52)
ANION GAP SERPL CALCULATED.3IONS-SCNC: 10 MMOL/L (ref 4–13)
AST SERPL W P-5'-P-CCNC: 25 U/L (ref 13–39)
BASOPHILS # BLD AUTO: 0.04 THOUSANDS/ΜL (ref 0–0.1)
BASOPHILS NFR BLD AUTO: 1 % (ref 0–1)
BILIRUB SERPL-MCNC: 1.35 MG/DL (ref 0.2–1)
BUN SERPL-MCNC: 11 MG/DL (ref 5–25)
CALCIUM SERPL-MCNC: 9.8 MG/DL (ref 8.4–10.2)
CHLORIDE SERPL-SCNC: 104 MMOL/L (ref 96–108)
CO2 SERPL-SCNC: 26 MMOL/L (ref 21–32)
CREAT SERPL-MCNC: 0.75 MG/DL (ref 0.6–1.3)
EOSINOPHIL # BLD AUTO: 0.26 THOUSAND/ΜL (ref 0–0.61)
EOSINOPHIL NFR BLD AUTO: 4 % (ref 0–6)
ERYTHROCYTE [DISTWIDTH] IN BLOOD BY AUTOMATED COUNT: 12.3 % (ref 11.6–15.1)
GFR SERPL CREATININE-BSD FRML MDRD: 123 ML/MIN/1.73SQ M
GLUCOSE SERPL-MCNC: 96 MG/DL (ref 65–140)
HCT VFR BLD AUTO: 41.3 % (ref 36.5–49.3)
HGB BLD-MCNC: 13.8 G/DL (ref 12–17)
IMM GRANULOCYTES # BLD AUTO: 0.01 THOUSAND/UL (ref 0–0.2)
IMM GRANULOCYTES NFR BLD AUTO: 0 % (ref 0–2)
LIPASE SERPL-CCNC: 16 U/L (ref 11–82)
LYMPHOCYTES # BLD AUTO: 0.82 THOUSANDS/ΜL (ref 0.6–4.47)
LYMPHOCYTES NFR BLD AUTO: 13 % (ref 14–44)
MCH RBC QN AUTO: 30.7 PG (ref 26.8–34.3)
MCHC RBC AUTO-ENTMCNC: 33.4 G/DL (ref 31.4–37.4)
MCV RBC AUTO: 92 FL (ref 82–98)
MONOCYTES # BLD AUTO: 0.6 THOUSAND/ΜL (ref 0.17–1.22)
MONOCYTES NFR BLD AUTO: 9 % (ref 4–12)
NEUTROPHILS # BLD AUTO: 4.62 THOUSANDS/ΜL (ref 1.85–7.62)
NEUTS SEG NFR BLD AUTO: 73 % (ref 43–75)
NRBC BLD AUTO-RTO: 0 /100 WBCS
PLATELET # BLD AUTO: 212 THOUSANDS/UL (ref 149–390)
PMV BLD AUTO: 9.6 FL (ref 8.9–12.7)
POTASSIUM SERPL-SCNC: 4 MMOL/L (ref 3.5–5.3)
PROT SERPL-MCNC: 7.7 G/DL (ref 6.4–8.4)
RBC # BLD AUTO: 4.5 MILLION/UL (ref 3.88–5.62)
SODIUM SERPL-SCNC: 140 MMOL/L (ref 135–147)
WBC # BLD AUTO: 6.35 THOUSAND/UL (ref 4.31–10.16)

## 2024-10-22 PROCEDURE — 85025 COMPLETE CBC W/AUTO DIFF WBC: CPT | Performed by: EMERGENCY MEDICINE

## 2024-10-22 PROCEDURE — 99284 EMERGENCY DEPT VISIT MOD MDM: CPT | Performed by: EMERGENCY MEDICINE

## 2024-10-22 PROCEDURE — 36415 COLL VENOUS BLD VENIPUNCTURE: CPT | Performed by: EMERGENCY MEDICINE

## 2024-10-22 PROCEDURE — 83690 ASSAY OF LIPASE: CPT | Performed by: EMERGENCY MEDICINE

## 2024-10-22 PROCEDURE — 80053 COMPREHEN METABOLIC PANEL: CPT | Performed by: EMERGENCY MEDICINE

## 2024-10-22 PROCEDURE — 99283 EMERGENCY DEPT VISIT LOW MDM: CPT

## 2024-10-22 RX ORDER — MAGNESIUM HYDROXIDE/ALUMINUM HYDROXICE/SIMETHICONE 120; 1200; 1200 MG/30ML; MG/30ML; MG/30ML
30 SUSPENSION ORAL ONCE
Status: COMPLETED | OUTPATIENT
Start: 2024-10-22 | End: 2024-10-22

## 2024-10-22 RX ORDER — FAMOTIDINE 20 MG/1
20 TABLET, FILM COATED ORAL ONCE
Status: COMPLETED | OUTPATIENT
Start: 2024-10-22 | End: 2024-10-22

## 2024-10-22 RX ORDER — FAMOTIDINE 20 MG/1
20 TABLET, FILM COATED ORAL 2 TIMES DAILY
Qty: 30 TABLET | Refills: 0 | Status: SHIPPED | OUTPATIENT
Start: 2024-10-22 | End: 2024-11-06

## 2024-10-22 RX ADMIN — FAMOTIDINE 20 MG: 20 TABLET, FILM COATED ORAL at 10:15

## 2024-10-22 RX ADMIN — ALUMINUM HYDROXIDE, MAGNESIUM HYDROXIDE, AND DIMETHICONE: 200; 20; 200 SUSPENSION ORAL at 10:15

## 2024-10-22 NOTE — ED PROVIDER NOTES
Time reflects when diagnosis was documented in both MDM as applicable and the Disposition within this note       Time User Action Codes Description Comment    10/22/2024 11:33 AM Isabel Stern [K29.00] Acute gastritis without hemorrhage, unspecified gastritis type     10/22/2024 11:34 AM Isabel Stern [R10.13] Epigastric abdominal pain           ED Disposition       ED Disposition   Discharge    Condition   Stable    Date/Time   Tue Oct 22, 2024 11:33 AM    Comment   Arnaldo Esposito discharge to home/self care.                   Assessment & Plan       Medical Decision Making  28-year-old male presents with several days of epigastric abdominal pain.  On exam, patient normal vitals, no acute distress.  No abdominal tenderness noted on exam.  Differential diagnosis includes, but is not limited to, peptic ulcer disease, gastritis, pancreatitis, gallbladder disease, or other acute pathology.  Patient evaluated with CBC, CMP, and lipase.  Maalox and Pepcid ordered for symptomatic treatment.    Patient's labs overall unremarkable.  T. bili mildly elevated to 1.35, but this is similar when compared to labs from 11 months ago.  No other LFT abnormalities concerning for gallbladder pathology.  Lipase within normal limits.  Given reassuring exam without reproducible tenderness, and reassuring workup, likely peptic ulcer disease versus gastritis.  Patient given prescription for Pepcid, and a referral for gastroenterology.  Patient discharged home in stable condition with symptomatic care instructions and strict ED return precautions.    Amount and/or Complexity of Data Reviewed  Labs: ordered. Decision-making details documented in ED Course.    Risk  OTC drugs.        ED Course as of 10/22/24 1136   Tue Oct 22, 2024   1127 Total Bilirubin(!): 1.35  Similar to previous from 11 months ago.        Medications   aluminum-magnesium hydroxide-simethicone (MAALOX) oral suspension 30 mL ( Oral Given 10/22/24 1015)   famotidine  (PEPCID) tablet 20 mg (20 mg Oral Given 10/22/24 1015)       ED Risk Strat Scores                                               History of Present Illness       Chief Complaint   Patient presents with    Abdominal Pain     Upper middle abdominal pain X 5 days, denies N/V       Past Medical History:   Diagnosis Date    Heroin use     hasnt used in 6-7 years    Stroke (HCC)       Past Surgical History:   Procedure Laterality Date    AORTIC VALVE REPAIR      BRAIN SURGERY      IR STROKE ALERT  3/22/2016      No family history on file.   Social History     Tobacco Use    Smoking status: Former     Types: Cigarettes    Smokeless tobacco: Never   Substance Use Topics    Drug use: Not Currently     Types: Heroin     Comment: 6-7 years sober      E-Cigarette/Vaping      E-Cigarette/Vaping Substances    Nicotine Yes       I have reviewed and agree with the history as documented.     28-year-old male with a past medical history of heroin use, previous stroke, and congenital aortic stenosis status post aortic valve replacement who presents for evaluation with epigastric abdominal pain.  Patient states that pain has been ongoing for approximately 5 days.  He describes the pain as a dull aching sensation.  Pain does not radiate.  He states that the pain does come and go, no obvious association with eating.  He endorses some intermittent episodes of nausea, no episodes of emesis.  No recent fevers or chills.  Patient states that he has had similar episodes in the past and was told that it was due to constipation, so he has been taking laxatives and stool softeners, states that he is going to the bathroom regularly.  Patient denies any other concerning symptoms at this time.        Review of Systems   Constitutional:  Negative for chills and fever.   Respiratory:  Negative for shortness of breath.    Cardiovascular:  Negative for chest pain.   Gastrointestinal:  Positive for abdominal pain and nausea. Negative for vomiting.   All  other systems reviewed and are negative.          Objective       ED Triage Vitals [10/22/24 0939]   Temperature Pulse Blood Pressure Respirations SpO2 Patient Position - Orthostatic VS   97.8 °F (36.6 °C) 73 122/69 17 100 % Sitting      Temp Source Heart Rate Source BP Location FiO2 (%) Pain Score    Oral Monitor Left arm -- 7      Vitals      Date and Time Temp Pulse SpO2 Resp BP Pain Score FACES Pain Rating User   10/22/24 0939 97.8 °F (36.6 °C) 73 100 % 17 122/69 7 -- JS            Physical Exam  Vitals and nursing note reviewed.   Constitutional:       General: He is awake. He is not in acute distress.     Appearance: He is not toxic-appearing.   HENT:      Head: Normocephalic and atraumatic.   Eyes:      General: Vision grossly intact. Gaze aligned appropriately.   Cardiovascular:      Rate and Rhythm: Normal rate and regular rhythm.      Heart sounds: Murmur (from mechanical valve) heard.   Pulmonary:      Effort: Pulmonary effort is normal. No respiratory distress.      Breath sounds: Normal breath sounds.   Abdominal:      General: There is no distension.      Palpations: Abdomen is soft.      Tenderness: There is no abdominal tenderness.   Musculoskeletal:      Cervical back: Full passive range of motion without pain and neck supple.   Skin:     General: Skin is warm and dry.   Neurological:      General: No focal deficit present.      Mental Status: He is alert and oriented to person, place, and time.         Results Reviewed       Procedure Component Value Units Date/Time    Comprehensive metabolic panel [[id number]]  (Abnormal) Collected: 10/22/24 1017    Lab Status: Final result Specimen: Blood from Arm, Right Updated: 10/22/24 1119     Sodium 140 mmol/L      Potassium 4.0 mmol/L      Chloride 104 mmol/L      CO2 26 mmol/L      ANION GAP 10 mmol/L      BUN 11 mg/dL      Creatinine 0.75 mg/dL      Glucose 96 mg/dL      Calcium 9.8 mg/dL      AST 25 U/L      ALT 29 U/L      Alkaline Phosphatase 35 U/L       Total Protein 7.7 g/dL      Albumin 4.6 g/dL      Total Bilirubin 1.35 mg/dL      eGFR 123 ml/min/1.73sq m     Narrative:      [employer] guidelines for Chronic Kidney Disease (CKD):     Stage 1 with normal or high GFR (GFR > 90 mL/min/1.73 square meters)    Stage 2 Mild CKD (GFR = 60-89 mL/min/1.73 square meters)    Stage 3A Moderate CKD (GFR = 45-59 mL/min/1.73 square meters)    Stage 3B Moderate CKD (GFR = 30-44 mL/min/1.73 square meters)    Stage 4 Severe CKD (GFR = 15-29 mL/min/1.73 square meters)    Stage 5 End Stage CKD (GFR <15 mL/min/1.73 square meters)  Note: GFR calculation is accurate only with a steady state creatinine    Lipase [[id number]]  (Normal) Collected: 10/22/24 1017    Lab Status: Final result Specimen: Blood from Arm, Right Updated: 10/22/24 1119     Lipase 16 u/L     CBC and differential [[id number]]  (Abnormal) Collected: 10/22/24 1017    Lab Status: Final result Specimen: Blood from Arm, Right Updated: 10/22/24 1023     WBC 6.35 Thousand/uL      RBC 4.50 Million/uL      Hemoglobin 13.8 g/dL      Hematocrit 41.3 %      MCV 92 fL      MCH 30.7 pg      MCHC 33.4 g/dL      RDW 12.3 %      MPV 9.6 fL      Platelets 212 Thousands/uL      nRBC 0 /100 WBCs      Segmented % 73 %      Immature Grans % 0 %      Lymphocytes % 13 %      Monocytes % 9 %      Eosinophils Relative 4 %      Basophils Relative 1 %      Absolute Neutrophils 4.62 Thousands/µL      Absolute Immature Grans 0.01 Thousand/uL      Absolute Lymphocytes 0.82 Thousands/µL      Absolute Monocytes 0.60 Thousand/µL      Eosinophils Absolute 0.26 Thousand/µL      Basophils Absolute 0.04 Thousands/µL             No orders to display       Procedures    ED Medication and Procedure Management   Prior to Admission Medications   Prescriptions Last Dose Informant Patient Reported? Taking?   aspirin (ECOTRIN LOW STRENGTH) 81 mg EC tablet   Yes No   Sig: Take 81 mg by mouth   carvedilol (COREG) 25 mg tablet   Yes No   Sig:  Take 25 mg by mouth daily   lactulose 20 g/30 mL   No No   Sig: Take 30 mL (20 g total) by mouth daily for 7 days   levETIRAcetam (KEPPRA XR) 500 MG extended-release tablet   Yes No   Sig: TAKE 4 TABLETS BY MOUTH ONCE DAILY DO NOT CRUSH, CHEW, OR SPLIT   ofloxacin (OCUFLOX) 0.3 % ophthalmic solution   No No   Sig: Administer 1 drop to the right eye 4 (four) times a day   Patient not taking: Reported on 12/14/2023   warfarin (COUMADIN) 4 mg tablet   Yes No   Sig: TWO 4MG TABLETS DAILY      Facility-Administered Medications: None     Patient's Medications   Discharge Prescriptions    FAMOTIDINE (PEPCID) 20 MG TABLET    Take 1 tablet (20 mg total) by mouth 2 (two) times a day for 15 days       Start Date: 10/22/2024End Date: 11/6/2024       Order Dose: 20 mg       Quantity: 30 tablet    Refills: 0       ED SEPSIS DOCUMENTATION   Time reflects when diagnosis was documented in both MDM as applicable and the Disposition within this note       Time User Action Codes Description Comment    10/22/2024 11:33 AM Isabel Stern [K29.00] Acute gastritis without hemorrhage, unspecified gastritis type     10/22/2024 11:34 AM Isabel Stern [R10.13] Epigastric abdominal pain                  Isabel Stern DO  10/22/24 4776

## 2025-08-05 ENCOUNTER — OFFICE VISIT (OUTPATIENT)
Age: 30
End: 2025-08-05
Payer: COMMERCIAL

## 2025-08-05 ENCOUNTER — APPOINTMENT (OUTPATIENT)
Age: 30
End: 2025-08-05
Attending: PHYSICIAN ASSISTANT
Payer: COMMERCIAL

## 2025-08-05 VITALS
HEART RATE: 57 BPM | OXYGEN SATURATION: 95 % | SYSTOLIC BLOOD PRESSURE: 129 MMHG | RESPIRATION RATE: 18 BRPM | DIASTOLIC BLOOD PRESSURE: 74 MMHG | BODY MASS INDEX: 27.62 KG/M2 | WEIGHT: 187 LBS | TEMPERATURE: 98.7 F

## 2025-08-05 DIAGNOSIS — R06.89 ABNORMAL BREATH SOUNDS: ICD-10-CM

## 2025-08-05 DIAGNOSIS — B34.9 VIRAL SYNDROME: ICD-10-CM

## 2025-08-05 DIAGNOSIS — J22 LRTI (LOWER RESPIRATORY TRACT INFECTION): Primary | ICD-10-CM

## 2025-08-05 LAB
S PYO AG THROAT QL: NEGATIVE
SARS-COV-2 AG UPPER RESP QL IA: NEGATIVE
VALID CONTROL: NORMAL

## 2025-08-05 PROCEDURE — S9083 URGENT CARE CENTER GLOBAL: HCPCS | Performed by: PHYSICIAN ASSISTANT

## 2025-08-05 PROCEDURE — 87811 SARS-COV-2 COVID19 W/OPTIC: CPT | Performed by: PHYSICIAN ASSISTANT

## 2025-08-05 PROCEDURE — 87636 SARSCOV2 & INF A&B AMP PRB: CPT | Performed by: PHYSICIAN ASSISTANT

## 2025-08-05 PROCEDURE — 87880 STREP A ASSAY W/OPTIC: CPT | Performed by: PHYSICIAN ASSISTANT

## 2025-08-05 PROCEDURE — 87070 CULTURE OTHR SPECIMN AEROBIC: CPT | Performed by: PHYSICIAN ASSISTANT

## 2025-08-05 PROCEDURE — 71046 X-RAY EXAM CHEST 2 VIEWS: CPT

## 2025-08-05 PROCEDURE — G0383 LEV 4 HOSP TYPE B ED VISIT: HCPCS | Performed by: PHYSICIAN ASSISTANT

## 2025-08-05 RX ORDER — CARVEDILOL PHOSPHATE 20 MG/1
1 CAPSULE, EXTENDED RELEASE ORAL DAILY
COMMUNITY
Start: 2025-05-23

## 2025-08-05 RX ORDER — BUPRENORPHINE AND NALOXONE 8; 2 MG/1; MG/1
FILM, SOLUBLE BUCCAL; SUBLINGUAL DAILY
COMMUNITY
Start: 2025-05-12

## 2025-08-05 RX ORDER — ALBUTEROL SULFATE 90 UG/1
2 INHALANT RESPIRATORY (INHALATION) EVERY 6 HOURS PRN
Qty: 8.5 G | Refills: 0 | Status: SHIPPED | OUTPATIENT
Start: 2025-08-05

## 2025-08-05 RX ORDER — PREDNISONE 20 MG/1
40 TABLET ORAL DAILY
Qty: 10 TABLET | Refills: 0 | Status: SHIPPED | OUTPATIENT
Start: 2025-08-05 | End: 2025-08-10

## 2025-08-06 LAB
FLUAV RNA RESP QL NAA+PROBE: NEGATIVE
FLUBV RNA RESP QL NAA+PROBE: NEGATIVE
SARS-COV-2 RNA RESP QL NAA+PROBE: NEGATIVE

## 2025-08-08 LAB — BACTERIA THROAT CULT: NORMAL

## 2025-08-15 ENCOUNTER — HOSPITAL ENCOUNTER (EMERGENCY)
Facility: HOSPITAL | Age: 30
Discharge: STILL ADMITTED OR ADMINISTRATIVE | End: 2025-08-15
Attending: EMERGENCY MEDICINE
Payer: COMMERCIAL